# Patient Record
Sex: MALE | Race: WHITE | NOT HISPANIC OR LATINO | Employment: OTHER | URBAN - METROPOLITAN AREA
[De-identification: names, ages, dates, MRNs, and addresses within clinical notes are randomized per-mention and may not be internally consistent; named-entity substitution may affect disease eponyms.]

---

## 2017-03-24 ENCOUNTER — OFFICE VISIT (OUTPATIENT)
Dept: GASTROENTEROLOGY | Facility: CLINIC | Age: 82
End: 2017-03-24

## 2017-03-24 VITALS
TEMPERATURE: 98 F | BODY MASS INDEX: 17.49 KG/M2 | OXYGEN SATURATION: 94 % | WEIGHT: 108.8 LBS | DIASTOLIC BLOOD PRESSURE: 76 MMHG | HEART RATE: 78 BPM | HEIGHT: 66 IN | SYSTOLIC BLOOD PRESSURE: 130 MMHG

## 2017-03-24 DIAGNOSIS — K59.00 CONSTIPATION, UNSPECIFIED CONSTIPATION TYPE: Primary | ICD-10-CM

## 2017-03-24 PROCEDURE — 99204 OFFICE O/P NEW MOD 45 MIN: CPT | Performed by: INTERNAL MEDICINE

## 2017-03-24 RX ORDER — HYDROCODONE BITARTRATE AND ACETAMINOPHEN 5; 325 MG/1; MG/1
1 TABLET ORAL EVERY 6 HOURS PRN
COMMUNITY

## 2017-03-24 RX ORDER — TAMSULOSIN HYDROCHLORIDE 0.4 MG/1
1 CAPSULE ORAL EVERY 12 HOURS
COMMUNITY

## 2017-03-24 NOTE — PROGRESS NOTES
PCP: Javan Patel MD    Chief Complaint   Patient presents with   • Constipation       History of Present Illness:   HPI  Mr. 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 He has a greater than 60-pack-year history of tobacco abuse and recently stopped smoking.  The patient was found to have kidney stones and underwent  a lithotripsy about 3 months ago.  He was evaluated by Dr. Zambrano and there is a plan for further lithotripsy.  The patient has experienced problems with constipation for many months.  He will generally drink a bottle of magnesium citrate and take other medications to help with bowel function.  The patient denies any nayla blood in the stool.  He has experienced some abdominal discomfort on the left and right.  He denies any pain associated with meals.  There is no associated abdominal pain with bowel movements.  He denies any nayla weight loss and states he has been small for most of his adult life.  Mr. Rollins had a scan performed within the last couple weeks and he was told there were kidney stones and evidence for constipation.  There is no history of difficult or painful swallowing.  He denies any problems with heartburn.  The patient has been on some pain medication recently for kidney stones.  Past Medical History:   Diagnosis Date   • Black lung disease    • COPD (chronic obstructive pulmonary disease)    • Hernia    • Kidney stones        Past Surgical History:   Procedure Laterality Date   • HERNIA REPAIR     • KIDNEY STONE SURGERY           Current Outpatient Prescriptions:   •  HYDROcodone-acetaminophen (NORCO) 5-325 MG per tablet, Take 1 tablet by mouth Every 6 (Six) Hours As Needed for Moderate Pain (4-6)., Disp: , Rfl:   •  tamsulosin (FLOMAX) 0.4 MG capsule 24 hr capsule, Take 1 capsule by mouth Every 12 (Twelve) Hours., Disp: , Rfl:     Allergies   Allergen Reactions   • Procaine        Family History   Problem  Relation Age of Onset   • Lymphoma Sister        Social History     Social History   • Marital status:      Spouse name: N/A   • Number of children: N/A   • Years of education: N/A     Occupational History   • Not on file.     Social History Main Topics   • Smoking status: Former Smoker     Types: Cigarettes   • Smokeless tobacco: Not on file      Comment: quit a month ago   • Alcohol use No   • Drug use: Not on file   • Sexual activity: Not on file     Other Topics Concern   • Not on file     Social History Narrative   • No narrative on file       Review of Systems   Constitutional: Positive for fatigue. Negative for activity change, appetite change (poor), fever and unexpected weight change (gain).   HENT: Negative for dental problem, hearing loss, mouth sores, postnasal drip, sneezing, trouble swallowing and voice change.    Eyes: Negative for pain, redness, itching and visual disturbance.   Respiratory: Negative for cough, choking, chest tightness, shortness of breath and wheezing.    Cardiovascular: Negative for chest pain, palpitations and leg swelling.   Gastrointestinal: Positive for abdominal pain and constipation. Negative for abdominal distention, anal bleeding, blood in stool, diarrhea, nausea, rectal pain and vomiting.   Endocrine: Negative for cold intolerance, heat intolerance, polydipsia, polyphagia and polyuria.   Genitourinary: Negative.  Negative for dysuria, enuresis, flank pain, hematuria and urgency.   Musculoskeletal: Negative for arthralgias, back pain, gait problem, joint swelling and myalgias.   Skin: Negative for color change, pallor and rash.   Allergic/Immunologic: Negative for environmental allergies, food allergies and immunocompromised state.   Neurological: Positive for dizziness, weakness and headaches. Negative for tremors, seizures, facial asymmetry, speech difficulty and numbness.   Hematological: Negative for adenopathy.   Psychiatric/Behavioral: Negative for behavioral  problems, confusion, dysphoric mood, hallucinations and self-injury.       Vitals:    03/24/17 1054   BP: 130/76   Pulse: 78   Temp: 98 °F (36.7 °C)   SpO2: 94%       Physical Exam   Constitutional: He is oriented to person, place, and time. No distress.   Chronically ill appearing male   HENT:   Head: Normocephalic and atraumatic.   Mouth/Throat: Oropharynx is clear and moist. No oropharyngeal exudate.   Eyes: EOM are normal. Pupils are equal, round, and reactive to light. No scleral icterus.   Neck: Normal range of motion. Neck supple. No thyromegaly present.   Cardiovascular: Normal rate, regular rhythm and normal heart sounds.  Exam reveals no gallop.    No murmur heard.  Pulmonary/Chest: He has wheezes. He has no rales.   Abdominal: Soft. Bowel sounds are normal. He exhibits no distension. There is no tenderness. There is no rebound and no guarding.   Genitourinary:   Genitourinary Comments: Rectal exam did not demonstrate any blood per rectum. No mass felt.   Musculoskeletal: Normal range of motion. He exhibits no edema.   Lymphadenopathy:     He has no cervical adenopathy.   Neurological: He is alert and oriented to person, place, and time. He exhibits normal muscle tone.   Skin: Skin is dry. No erythema. No pallor.   Psychiatric: He has a normal mood and affect. His behavior is normal. Thought content normal.   Vitals reviewed.      Frederick was seen today for constipation.    Diagnoses and all orders for this visit:    Constipation, unspecified constipation type  -     FL Barium Enema Air Contrast; Future  The patient had a CT scan without IV contrast that demonstrated stool through the colon and possible wall thickening of the proximal colon, there was no gross evidence per report of a mass or adenopathy.  He has never had any colon evaluation to date.  There is a long-standing history of tobacco use and reported Black lung disease from coal exposure. The patient is on oxygen at home and with ambulation at  times.  He is at  high risk for undergoing colonoscopy.       Plan: Discussed the case with the patient and caregiver. Will proceed with barium enema to check for any major pathology.               I spent over 50% of the visit counseling and answering questions from the patient.

## 2017-03-27 ENCOUNTER — HOSPITAL ENCOUNTER (OUTPATIENT)
Dept: GENERAL RADIOLOGY | Facility: HOSPITAL | Age: 82
Discharge: HOME | End: 2017-03-27
Attending: INTERNAL MEDICINE | Admitting: INTERNAL MEDICINE

## 2017-03-27 DIAGNOSIS — K59.00 CONSTIPATION, UNSPECIFIED CONSTIPATION TYPE: ICD-10-CM

## 2017-03-27 PROCEDURE — 74280 X-RAY XM COLON 2CNTRST STD: CPT

## 2017-03-28 ENCOUNTER — TELEPHONE (OUTPATIENT)
Dept: GASTROENTEROLOGY | Facility: CLINIC | Age: 82
End: 2017-03-28

## 2017-03-28 ENCOUNTER — APPOINTMENT (OUTPATIENT)
Dept: GENERAL RADIOLOGY | Facility: HOSPITAL | Age: 82
End: 2017-03-28
Attending: INTERNAL MEDICINE

## 2017-03-28 NOTE — TELEPHONE ENCOUNTER
I called and spoke with Mr. Ria faustin.  I informed her there was no evidence for any colon mass.  He did have diverticulosis and some redundancy of the colon.  I recommend to drink a minimum of 4- 6  glasses of water daily in addition to increase fiber on a daily basis.